# Patient Record
(demographics unavailable — no encounter records)

---

## 2024-11-15 NOTE — HISTORY OF PRESENT ILLNESS
[de-identified] : 104.5 temp last night, blisters on thighs, sores in mouth per mom  [FreeTextEntry6] : rash on thighs worsening over 2 days  Had a fever last night  Low appetite today

## 2024-11-15 NOTE — DISCUSSION/SUMMARY
[FreeTextEntry1] : coxsackie vs. impetigo Rash appears more consistent with impetigo at this time given slight honey crusting Pharyngeal erythema but no blisters Will treat with oral keflex given rash extend to upper legs b/l and abdomen If no improvement over 3 days return for re-evaluation  Culture sent

## 2024-11-15 NOTE — PHYSICAL EXAM
[Erythematous Oropharynx] : erythematous oropharynx [NL] : normotonic [de-identified] : no visible blisters [de-identified] : Rash mostly over medial thigh extending to lower abdomen and posterior thighs. Papular erythematous rash with slight honey crusting. No open blisters or vesicles.

## 2025-02-13 NOTE — HISTORY OF PRESENT ILLNESS
[de-identified] : cough and fever [FreeTextEntry6] : here with cough and fever no vomiting or diarrhea

## 2025-02-13 NOTE — DISCUSSION/SUMMARY
[FreeTextEntry1] : rapid flu positive b/l AOM Supportive care encouraged for flu symptoms  Amoxicillin prescribed for AOM  Deferred tamiflu as he will be taking antibiotic, to limit GI side effects  Complete antibiotic course. Potential side effect of antibiotics includes but not limited to diarrhea. Provide ibuprofen as needed for pain or fever. If no improvement within 48 hours return for re-evaluation. Return for worsening symptoms or new concerns

## 2025-02-13 NOTE — HISTORY OF PRESENT ILLNESS
[de-identified] : cough and fever [FreeTextEntry6] : here with cough and fever no vomiting or diarrhea

## 2025-03-05 NOTE — HISTORY OF PRESENT ILLNESS
[de-identified] : recheck ears [FreeTextEntry6] : 2 years old is here with mom for recheck on his ears no fever has little runny nose finished antibiotics 2 weeks ago.